# Patient Record
Sex: MALE | Race: WHITE | NOT HISPANIC OR LATINO | URBAN - METROPOLITAN AREA
[De-identification: names, ages, dates, MRNs, and addresses within clinical notes are randomized per-mention and may not be internally consistent; named-entity substitution may affect disease eponyms.]

---

## 2019-03-26 ENCOUNTER — EMERGENCY (EMERGENCY)
Facility: HOSPITAL | Age: 49
LOS: 1 days | Discharge: HOME | End: 2019-03-26
Admitting: EMERGENCY MEDICINE
Payer: SELF-PAY

## 2019-03-26 VITALS
RESPIRATION RATE: 15 BRPM | HEART RATE: 87 BPM | TEMPERATURE: 99 F | SYSTOLIC BLOOD PRESSURE: 156 MMHG | DIASTOLIC BLOOD PRESSURE: 89 MMHG | OXYGEN SATURATION: 98 %

## 2019-03-26 VITALS
OXYGEN SATURATION: 98 % | DIASTOLIC BLOOD PRESSURE: 78 MMHG | TEMPERATURE: 98 F | SYSTOLIC BLOOD PRESSURE: 115 MMHG | HEART RATE: 80 BPM | RESPIRATION RATE: 16 BRPM

## 2019-03-26 DIAGNOSIS — R04.0 EPISTAXIS: ICD-10-CM

## 2019-03-26 PROCEDURE — 99053 MED SERV 10PM-8AM 24 HR FAC: CPT

## 2019-03-26 PROCEDURE — 99284 EMERGENCY DEPT VISIT MOD MDM: CPT | Mod: 25

## 2019-03-26 PROCEDURE — 70450 CT HEAD/BRAIN W/O DYE: CPT | Mod: 26

## 2019-03-26 RX ORDER — ACETAMINOPHEN 500 MG
650 TABLET ORAL ONCE
Qty: 0 | Refills: 0 | Status: COMPLETED | OUTPATIENT
Start: 2019-03-26 | End: 2019-03-26

## 2019-03-26 RX ADMIN — Medication 650 MILLIGRAM(S): at 02:19

## 2019-03-26 NOTE — ED PROVIDER NOTE - ENMT, MLM
Airway patent, Nasal mucosa clear. Mouth with normal mucosa. Throat has no vesicles, no oropharyngeal exudates and uvula is midline. no septal hematoma, slight tenderness over nasal bridge, no other facial bone tenderness
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
## 2019-03-26 NOTE — ED PROVIDER NOTE - CHPI ED SYMPTOMS NEG
no loss of consciousness/no nausea/no syncope/no change in level of consciousness/no seizure/no weakness

## 2019-03-26 NOTE — ED PROVIDER NOTE - TEMPLATE
RADHA patient refuses to continue lovenox:    Patient was in for INR and was 1.5 today, so patient will take 15 mg today, see other dosing below. Will recheck on 1/21/19    Post ablation 1/14/19, took last lovenox injection today. Patient refuses to take any more Lovenox.  He did increase his dose. M-12.5 mg, 10mg Tues, wed =10 mg.  Will give another bump today since patient refuses to continue lovenox.  Aparna Vicente RN    
Head Injury

## 2019-03-26 NOTE — ED PROVIDER NOTE - CROS ED CONS ALL NEG
Duration Of Freeze Thaw-Cycle (Seconds): 5 Post-Care Instructions: I reviewed with the patient in detail post-care instructions. Patient is to wear sunprotection, and avoid picking at any of the treated lesions. Pt may apply Vaseline to crusted or scabbing areas. Consent: The patient's consent was obtained including but not limited to risks of crusting, scabbing, blistering, scarring, darker or lighter pigmentary change, recurrence, incomplete removal and infection. Detail Level: Detailed Render Post-Care Instructions In Note?: yes negative...
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
## 2019-04-09 NOTE — ED ADULT NURSE NOTE - NS ED NURSE DC INFO COMPLEXITY
Wife is calling. I told her she needs to have the patient do an MRI. She asked me if it can be done in [facility], I said yes, just as long as it is in a [facility] other wise I would need to fax the order to the facility. Wife stated she will schedule it in [facility]. Simple: Patient demonstrates quick and easy understanding/Verbalized Understanding

## 2022-07-19 NOTE — ED ADULT NURSE NOTE - NSFALLRSKINDICATORS_ED_ALL_ED
Render Risk Assessment In Note?: no Detail Level: Zone Recommendations (Free Text): We discussed using the topical antifungal for about two weeks behind clearance today you’re clear so you can stop everything you have the topical at home if you notice a recurrence Recommendation Preamble: The following recommendations were made during the visit: yes

## 2022-08-19 NOTE — ED ADULT NURSE NOTE - NSFALLRSKINDICTYPE_ED_ALL_ED
General- NAD. Non toxic appearing and mentating. Pleasant and responsive. Creole speaking.  Abdomen- Soft, non tender to palpation across all quadrants. Midline laparotomy wound, well healed. Surgically absent umbilicus. Old ileostomy side R side, well healed.  Lungs- Breathing comfortably.
Intoxication